# Patient Record
Sex: FEMALE | Race: BLACK OR AFRICAN AMERICAN | Employment: UNEMPLOYED | URBAN - METROPOLITAN AREA
[De-identification: names, ages, dates, MRNs, and addresses within clinical notes are randomized per-mention and may not be internally consistent; named-entity substitution may affect disease eponyms.]

---

## 2021-07-15 VITALS
TEMPERATURE: 98 F | SYSTOLIC BLOOD PRESSURE: 115 MMHG | HEART RATE: 64 BPM | RESPIRATION RATE: 18 BRPM | DIASTOLIC BLOOD PRESSURE: 48 MMHG | OXYGEN SATURATION: 97 %

## 2021-07-15 PROCEDURE — 99282 EMERGENCY DEPT VISIT SF MDM: CPT

## 2021-07-16 ENCOUNTER — HOSPITAL ENCOUNTER (EMERGENCY)
Age: 36
Discharge: HOME | End: 2021-07-16
Attending: EMERGENCY MEDICINE
Payer: MEDICAID

## 2021-07-16 DIAGNOSIS — R56.9 SEIZURE (HCC): Primary | ICD-10-CM

## 2021-07-16 RX ORDER — LEVETIRACETAM 500 MG/1
1500 TABLET ORAL
Status: DISCONTINUED | OUTPATIENT
Start: 2021-07-16 | End: 2021-07-16 | Stop reason: HOSPADM

## 2021-07-16 RX ORDER — LEVETIRACETAM 1000 MG/1
TABLET ORAL
Qty: 15 TABLET | Refills: 0 | Status: SHIPPED | OUTPATIENT
Start: 2021-07-16 | End: 2021-07-20

## 2021-07-16 NOTE — ED NOTES
Patient refused Keppra after education provided. Patient demanding food and water. Patient discharged with paperwork and no signs of distress.

## 2021-07-16 NOTE — ED TRIAGE NOTES
She says she has not felt well all day and her  thinks she had a seizure on the way to the hospital. She says she hasnt had any of her medications for about 3-4 days.

## 2021-07-16 NOTE — ED PROVIDER NOTES
Codi Rosas is a 37 y.o. female with a history of ADD, Anemia, Depression, Seizure who presents to the ED c/o seizure which occurred earlier today. Patient describes seizure has full body convulsions and states it occurred earlier today. States she was initially going to be seen at [facility] but she got tired of waiting. Patient reports she has missed 3 to 4 days of medication. States she typically takes 2 mg of Keppra per day. Also complains of diffuse body pain because she ran out of her alprazolam and clonazepam.  Requesting a refill and states that her doctor at [facility] is no longer in business and she is unsure why. She denies any injury or recent fall. Unsure of the last episode of seizure that she had. On further evaluation of patient's chart she had similar presentation when she was seen at [facility] on 2021. Patient denies any headache, dizziness, lightheadedness, chest pain, shortness of breath, difficulty breathing, visual changes, bowel or bladder incontinence, abdominal pain, N/V/D, numbness or weakness. Past Medical History:   Diagnosis Date    Anxiety     Seizures ([facility] [address]     Sickle cell trait ([facility] [address]        Past Surgical History:   Procedure Laterality Date    HX  SECTION      HX UROLOGICAL      2016 ruptured bladder         No family history on file.     Social History     Socioeconomic History    Marital status: Not on file     Spouse name: Not on file    Number of children: Not on file    Years of education: Not on file    Highest education level: Not on file   Occupational History    Not on file   Tobacco Use    Smoking status: Former Smoker   Substance and Sexual Activity    Alcohol use: Not on file    Drug use: Not on file    Sexual activity: Not on file   Other Topics Concern    Not on file   Social History Narrative    Not on file     Social Determinants of Health     Financial Resource Strain:     Difficulty of Paying Living Expenses:    Food Insecurity:     Worried About [facility] in the Last Year:    [address] in the Last Year:    Transportation Needs:     Lack of Transportation (Medical):  Lack of Transportation (Non-Medical):    Physical Activity:     Days of Exercise per Week:     Minutes of Exercise per Session:    Stress:     Feeling of Stress :    Social Connections:     Frequency of Communication with Friends and Family:     Frequency of Social Gatherings with Friends and Family:     Attends Yazdanism Services:     Active Member of Clubs or Organizations:     Attends Club or Organization Meetings:     Marital Status:    Intimate Partner Violence:     Fear of Current or Ex-Partner:     Emotionally Abused:     Physically Abused:     Sexually Abused: ALLERGIES: Penicillins    Review of Systems   Constitutional: Negative for activity change, appetite change, chills, fatigue and fever. HENT: Negative for congestion and sore throat. Eyes: Negative for pain, discharge and visual disturbance. Respiratory: Negative for cough and shortness of breath. Cardiovascular: Negative for chest pain. Gastrointestinal: Negative for abdominal distention, abdominal pain, diarrhea, nausea and vomiting. Genitourinary: Negative for dysuria, enuresis and urgency. Musculoskeletal: Negative for back pain and neck pain. Skin: Negative for rash. Allergic/Immunologic: Negative for immunocompromised state. Neurological: Positive for seizures. Negative for dizziness, syncope, facial asymmetry, speech difficulty, weakness, light-headedness, numbness and headaches. Psychiatric/Behavioral: Negative for confusion. All other systems reviewed and are negative. Vitals:    07/15/21 2346   BP: (!) 115/48   Pulse: 64   Resp: 18   Temp: 98 °F (36.7 °C)   SpO2: 97%            Physical Exam  Vitals and nursing note reviewed. Constitutional:       General: She is not in acute distress. Appearance: Normal appearance.  She is well-developed. She is obese. She is not toxic-appearing. HENT:      Head: Normocephalic and atraumatic. Nose: Nose normal.      Mouth/Throat:      Mouth: Mucous membranes are moist.   Eyes:      General: Lids are normal.      Extraocular Movements: Extraocular movements intact. Conjunctiva/sclera: Conjunctivae normal.      Pupils: Pupils are equal, round, and reactive to light. Cardiovascular:      Rate and Rhythm: Normal rate and regular rhythm. Pulses: Normal pulses. Heart sounds: Normal heart sounds, S1 normal and S2 normal.   Pulmonary:      Effort: Pulmonary effort is normal. No accessory muscle usage. Breath sounds: Normal breath sounds. Abdominal:      Palpations: Abdomen is soft. Tenderness: There is no abdominal tenderness. Musculoskeletal:         General: Normal range of motion. Cervical back: Normal range of motion and neck supple. Skin:     General: Skin is warm and dry. Capillary Refill: Capillary refill takes less than 2 seconds. Neurological:      General: No focal deficit present. Mental Status: She is alert and oriented to person, place, and time. Mental status is at baseline. Cranial Nerves: No cranial nerve deficit. Motor: No weakness. Gait: Gait normal.   Psychiatric:         Attention and Perception: Attention normal.         Mood and Affect: Mood and affect normal.         Speech: Speech normal.         Behavior: Behavior is cooperative. Thought Content: Thought content normal.         Cognition and Memory: Cognition normal.         Judgment: Judgment normal.          MDM  Number of Diagnoses or Management Options  Seizure ([facility] [address]  Diagnosis management comments: Patient presents with 2 seizures earlier today. Has been noncompliant with keppra missed passed 3-4 days of medication. Seen at [facility] earlier and LWBS because she said wait was too long. VSS, no focal neuro deficits noted. Patient is well appearing.  Will give loading dose of Keppra PO and discharge patient with follow-up to neurology. Return to ER warnings discussed in detail. Dr. Tony Holloway aware and agrees with evaluation and plan. Upon discharging patient, she was very angry I would not give rx for clonazepam and refusing dose of keppra. Discussed how Keppra was indicated for seizures and that rx for clonazepam was not going to be given. Patient angry that I did not give her anything to eat or drink while in ED. Patient will be discharged home with rx for keppra and f/u with neurology at [facility].        Amount and/or Complexity of Data Reviewed  Review and summarize past medical records: yes  Discuss the patient with other providers: yes (Dr. Tony Holloway, ED Attending )           Procedures

## 2021-07-16 NOTE — DISCHARGE INSTRUCTIONS
Take Keppra as prescribed. Schedule an appointment to be seen by your neurologist. Return to ER if you develop worsening symptoms.